# Patient Record
Sex: MALE | Race: WHITE | HISPANIC OR LATINO | URBAN - METROPOLITAN AREA
[De-identification: names, ages, dates, MRNs, and addresses within clinical notes are randomized per-mention and may not be internally consistent; named-entity substitution may affect disease eponyms.]

---

## 2017-07-27 ENCOUNTER — INPATIENT (INPATIENT)
Facility: HOSPITAL | Age: 47
LOS: 2 days | Discharge: HOME | End: 2017-07-30
Attending: FAMILY MEDICINE | Admitting: INTERNAL MEDICINE
Payer: COMMERCIAL

## 2017-07-27 VITALS — WEIGHT: 175.05 LBS | HEIGHT: 73 IN

## 2017-07-27 DIAGNOSIS — I21.3 ST ELEVATION (STEMI) MYOCARDIAL INFARCTION OF UNSPECIFIED SITE: ICD-10-CM

## 2017-07-27 DIAGNOSIS — E78.5 HYPERLIPIDEMIA, UNSPECIFIED: ICD-10-CM

## 2017-07-27 DIAGNOSIS — Z98.890 OTHER SPECIFIED POSTPROCEDURAL STATES: Chronic | ICD-10-CM

## 2017-07-27 DIAGNOSIS — N17.9 ACUTE KIDNEY FAILURE, UNSPECIFIED: ICD-10-CM

## 2017-07-27 LAB
ALBUMIN SERPL ELPH-MCNC: 4.4 G/DL — SIGNIFICANT CHANGE UP (ref 3.3–5)
ALP SERPL-CCNC: 103 U/L — SIGNIFICANT CHANGE UP (ref 40–120)
ALT FLD-CCNC: 78 U/L — SIGNIFICANT CHANGE UP (ref 12–78)
ANION GAP SERPL CALC-SCNC: 11 MMOL/L — SIGNIFICANT CHANGE UP (ref 5–17)
AST SERPL-CCNC: 47 U/L — HIGH (ref 15–37)
BASOPHILS # BLD AUTO: 0.2 K/UL — SIGNIFICANT CHANGE UP (ref 0–0.2)
BASOPHILS NFR BLD AUTO: 1.8 % — SIGNIFICANT CHANGE UP (ref 0–2)
BILIRUB SERPL-MCNC: 0.7 MG/DL — SIGNIFICANT CHANGE UP (ref 0.2–1.2)
BLD GP AB SCN SERPL QL: SIGNIFICANT CHANGE UP
BUN SERPL-MCNC: 14 MG/DL — SIGNIFICANT CHANGE UP (ref 7–23)
CALCIUM SERPL-MCNC: 9.5 MG/DL — SIGNIFICANT CHANGE UP (ref 8.5–10.1)
CHLORIDE SERPL-SCNC: 105 MMOL/L — SIGNIFICANT CHANGE UP (ref 96–108)
CO2 SERPL-SCNC: 25 MMOL/L — SIGNIFICANT CHANGE UP (ref 22–31)
CREAT SERPL-MCNC: 1.56 MG/DL — HIGH (ref 0.5–1.3)
EOSINOPHIL # BLD AUTO: 0.2 K/UL — SIGNIFICANT CHANGE UP (ref 0–0.5)
EOSINOPHIL NFR BLD AUTO: 2.4 % — SIGNIFICANT CHANGE UP (ref 0–6)
GLUCOSE SERPL-MCNC: 143 MG/DL — HIGH (ref 70–99)
HCT VFR BLD CALC: 46 % — SIGNIFICANT CHANGE UP (ref 39–50)
HGB BLD-MCNC: 16.3 G/DL — SIGNIFICANT CHANGE UP (ref 13–17)
LYMPHOCYTES # BLD AUTO: 3.7 K/UL — HIGH (ref 1–3.3)
LYMPHOCYTES # BLD AUTO: 35.2 % — SIGNIFICANT CHANGE UP (ref 13–44)
MCHC RBC-ENTMCNC: 31.1 PG — SIGNIFICANT CHANGE UP (ref 27–34)
MCHC RBC-ENTMCNC: 35.4 GM/DL — SIGNIFICANT CHANGE UP (ref 32–36)
MCV RBC AUTO: 87.8 FL — SIGNIFICANT CHANGE UP (ref 80–100)
MONOCYTES # BLD AUTO: 0.7 K/UL — SIGNIFICANT CHANGE UP (ref 0–0.9)
MONOCYTES NFR BLD AUTO: 6.9 % — SIGNIFICANT CHANGE UP (ref 2–14)
NEUTROPHILS # BLD AUTO: 5.6 K/UL — SIGNIFICANT CHANGE UP (ref 1.8–7.4)
NEUTROPHILS NFR BLD AUTO: 53.8 % — SIGNIFICANT CHANGE UP (ref 43–77)
PLATELET # BLD AUTO: 292 K/UL — SIGNIFICANT CHANGE UP (ref 150–400)
POTASSIUM SERPL-MCNC: 4 MMOL/L — SIGNIFICANT CHANGE UP (ref 3.5–5.3)
POTASSIUM SERPL-SCNC: 4 MMOL/L — SIGNIFICANT CHANGE UP (ref 3.5–5.3)
PROT SERPL-MCNC: 7.4 GM/DL — SIGNIFICANT CHANGE UP (ref 6–8.3)
RBC # BLD: 5.24 M/UL — SIGNIFICANT CHANGE UP (ref 4.2–5.8)
RBC # FLD: 11.9 % — SIGNIFICANT CHANGE UP (ref 10.3–14.5)
SODIUM SERPL-SCNC: 141 MMOL/L — SIGNIFICANT CHANGE UP (ref 135–145)
TROPONIN I SERPL-MCNC: <0.015 NG/ML — SIGNIFICANT CHANGE UP (ref 0.01–0.04)
TYPE + AB SCN PNL BLD: SIGNIFICANT CHANGE UP
WBC # BLD: 10.4 K/UL — SIGNIFICANT CHANGE UP (ref 3.8–10.5)
WBC # FLD AUTO: 10.4 K/UL — SIGNIFICANT CHANGE UP (ref 3.8–10.5)

## 2017-07-27 PROCEDURE — 99285 EMERGENCY DEPT VISIT HI MDM: CPT

## 2017-07-27 PROCEDURE — 93010 ELECTROCARDIOGRAM REPORT: CPT | Mod: 76

## 2017-07-27 RX ORDER — HEPARIN SODIUM 5000 [USP'U]/ML
5000 INJECTION INTRAVENOUS; SUBCUTANEOUS ONCE
Qty: 0 | Refills: 0 | Status: COMPLETED | OUTPATIENT
Start: 2017-07-27 | End: 2017-07-27

## 2017-07-27 RX ORDER — ATORVASTATIN CALCIUM 80 MG/1
80 TABLET, FILM COATED ORAL AT BEDTIME
Qty: 0 | Refills: 0 | Status: DISCONTINUED | OUTPATIENT
Start: 2017-07-27 | End: 2017-07-30

## 2017-07-27 RX ORDER — METOPROLOL TARTRATE 50 MG
5 TABLET ORAL ONCE
Qty: 0 | Refills: 0 | Status: COMPLETED | OUTPATIENT
Start: 2017-07-27 | End: 2017-07-27

## 2017-07-27 RX ORDER — MORPHINE SULFATE 50 MG/1
4 CAPSULE, EXTENDED RELEASE ORAL ONCE
Qty: 0 | Refills: 0 | Status: DISCONTINUED | OUTPATIENT
Start: 2017-07-27 | End: 2017-07-27

## 2017-07-27 RX ORDER — ASPIRIN/CALCIUM CARB/MAGNESIUM 324 MG
81 TABLET ORAL DAILY
Qty: 0 | Refills: 0 | Status: DISCONTINUED | OUTPATIENT
Start: 2017-07-28 | End: 2017-07-30

## 2017-07-27 RX ORDER — TICAGRELOR 90 MG/1
90 TABLET ORAL
Qty: 0 | Refills: 0 | Status: DISCONTINUED | OUTPATIENT
Start: 2017-07-27 | End: 2017-07-30

## 2017-07-27 RX ORDER — LISINOPRIL 2.5 MG/1
2.5 TABLET ORAL DAILY
Qty: 0 | Refills: 0 | Status: DISCONTINUED | OUTPATIENT
Start: 2017-07-28 | End: 2017-07-30

## 2017-07-27 RX ORDER — SODIUM CHLORIDE 9 MG/ML
3 INJECTION INTRAMUSCULAR; INTRAVENOUS; SUBCUTANEOUS ONCE
Qty: 0 | Refills: 0 | Status: COMPLETED | OUTPATIENT
Start: 2017-07-27 | End: 2017-07-27

## 2017-07-27 RX ORDER — ACETAMINOPHEN 500 MG
650 TABLET ORAL EVERY 6 HOURS
Qty: 0 | Refills: 0 | Status: DISCONTINUED | OUTPATIENT
Start: 2017-07-27 | End: 2017-07-28

## 2017-07-27 RX ORDER — METOPROLOL TARTRATE 50 MG
25 TABLET ORAL
Qty: 0 | Refills: 0 | Status: DISCONTINUED | OUTPATIENT
Start: 2017-07-27 | End: 2017-07-29

## 2017-07-27 RX ORDER — HEPARIN SODIUM 5000 [USP'U]/ML
4000 INJECTION INTRAVENOUS; SUBCUTANEOUS ONCE
Qty: 0 | Refills: 0 | Status: DISCONTINUED | OUTPATIENT
Start: 2017-07-27 | End: 2017-07-27

## 2017-07-27 RX ORDER — SODIUM CHLORIDE 9 MG/ML
1000 INJECTION INTRAMUSCULAR; INTRAVENOUS; SUBCUTANEOUS
Qty: 0 | Refills: 0 | Status: DISCONTINUED | OUTPATIENT
Start: 2017-07-27 | End: 2017-07-30

## 2017-07-27 RX ORDER — ASPIRIN/CALCIUM CARB/MAGNESIUM 324 MG
325 TABLET ORAL ONCE
Qty: 0 | Refills: 0 | Status: COMPLETED | OUTPATIENT
Start: 2017-07-27 | End: 2017-07-27

## 2017-07-27 RX ORDER — HEPARIN SODIUM 5000 [USP'U]/ML
4000 INJECTION INTRAVENOUS; SUBCUTANEOUS EVERY 6 HOURS
Qty: 0 | Refills: 0 | Status: DISCONTINUED | OUTPATIENT
Start: 2017-07-27 | End: 2017-07-27

## 2017-07-27 RX ADMIN — MORPHINE SULFATE 4 MILLIGRAM(S): 50 CAPSULE, EXTENDED RELEASE ORAL at 18:29

## 2017-07-27 RX ADMIN — SODIUM CHLORIDE 50 MILLILITER(S): 9 INJECTION INTRAMUSCULAR; INTRAVENOUS; SUBCUTANEOUS at 20:18

## 2017-07-27 RX ADMIN — SODIUM CHLORIDE 3 MILLILITER(S): 9 INJECTION INTRAMUSCULAR; INTRAVENOUS; SUBCUTANEOUS at 18:27

## 2017-07-27 RX ADMIN — HEPARIN SODIUM 5000 UNIT(S): 5000 INJECTION INTRAVENOUS; SUBCUTANEOUS at 18:30

## 2017-07-27 RX ADMIN — Medication 325 MILLIGRAM(S): at 18:32

## 2017-07-27 RX ADMIN — Medication 5 MILLIGRAM(S): at 18:34

## 2017-07-27 RX ADMIN — MORPHINE SULFATE 4 MILLIGRAM(S): 50 CAPSULE, EXTENDED RELEASE ORAL at 18:33

## 2017-07-27 NOTE — CONSULT NOTE ADULT - ASSESSMENT
Anterior wall STEMI    Suggest:    Cardiac monitor  O2 supplement  Follow up Cardiac enzymes  Treat with DAPT  IV Heparin bolus  Beta blockers  Statins  Ischemia evaluation - Based on pt's symptoms, history, risk factors, exam, lab and EKG data I have advised pt have a cardiac catheterization and possible percutaneous coronary intervention. Procedure, its risks, alternatives, benefits and potential complications were discussed in detail. Risks include but not limited to bleeding, infection, allergy, renal failure requiring dialysis, stroke, vascular injury, pericardial tamponade, arrhythmias, MI and even death. Pt is agreeable and has consented for the procedure and the procedure is being scheduled.     Admit to CCU. Further treatment orders after cardiac cath, meanwhile continue current treatment with DAPT. Keep pain free with Morphine as needed and tolerated. Keep optimal BP and HR.

## 2017-07-27 NOTE — CONSULT NOTE ADULT - SUBJECTIVE AND OBJECTIVE BOX
Chief complaint of chest pain    HPI:  45 yo male with CP while playing basketball. Diaphoretic. Clenching his chest. No syncope.      PAST MEDICAL & SURGICAL HISTORY:  None       PREVIOUS CARDIAC WORKUP:   None       ALLERGIES:    No Known Allergies       MEDICATIONS  (STANDING):    MEDICATIONS  (PRN):  heparin  Injectable 4000 Unit(s) IV Push every 6 hours PRN For aPTT less than 40      FAMILY HISTORY:  Negative for early onset CAD         SOCIAL HISTORY:  Nonsmoker. No ETOH abuse. No illicit drugs.     ROS:     A comprehensive review of systems was performed and pertinent items are noted in the history above. A detailed ROS is as follows:    Constitutional:	no weakness, no fever, no chills, no malaise, no weight change, no appetite changes  HEENT: 	no vision change, no hearing loss, no epistaxis, no sore throat, no hoarseness  Neck:		no neck pain, no neck swelling, no swollen glands  Respiratory:	+ dyspnea, no cough, no wheezing, no hemoptysis  Cardiac:	+ chest pain, no palpitations, no orthopnea, no dizziness, no syncope  GI:		no nausea, no vomiting, no change in BM, no GI bleed, no melena, no abdominal pain, no GERD, no dysphagia  :		no dysuria, no polyuria, no hematuria, no incontinence  Skin/Breast: 	no skin rash, no breast mass  Heme: 		no bleeding disorder, no clotting disorder, no easy bruising, no swollen lymph nodes  MS:		no arthralgia, no back pain, no myalgia  Vascular:	no claudication, no varicose veins, no ulcers  Neuro:		no sensory loss, no motor loss, no tremors, no seizures, no gait problems, no headache  Behavioral: 	no mood change, depression, anxiety, suicidal attempts  Endocrine:	no polydipsia, no polyphagia, no polyuria, no skin dryness, no temperature intolerance  Immunologic:	no anaphylaxis, no angioedema, no urticaria   Vital Signs Last 24 Hrs  T(C): 36.4 (27 Jul 2017 18:22), Max: 36.4 (27 Jul 2017 18:22)  T(F): 97.6 (27 Jul 2017 18:22), Max: 97.6 (27 Jul 2017 18:22)  HR: 103 (27 Jul 2017 18:22) (103 - 103)  BP: 148/81 (27 Jul 2017 18:22) (148/81 - 148/81)  BP(mean): --  RR: 25 (27 Jul 2017 18:22) (25 - 25)  SpO2: 100% (27 Jul 2017 18:22) (100% - 100%)    I&O's Summary      PHYSICAL EXAM:    General Appearance:    AAO X 3, uncomfortable.  HEENT:                       Atraumatic, PERRLA, EOMI, conjunctiva clear.   Neck:                          Supple, no adenopathy, no thyromegaly, no JVD, no carotid bruit  Back:                          Symmetric, no CV angle fullness or tenderness, no soft tissue tenderness.  Chest:                         Clear to auscultation, no wheezes, rales or rhonchi, symmetric air entry, no tachypnea, retractions or cyanosis  Heart:                          S1, S2 normal, no gallop, no murmur.  Abdomen:                    Soft, non-tender, bowel sounds active. No palpable masses.   Extremities:                 no cyanosis, no edema, no joint swelling. Peripheral pulses normal  Skin:                           Skin color, texture normal. No rashes   Neurologic:                  Grossly cranial nerves intact, sensory and motor normal.      TELEMETRY:  Sinus tachycardia    ECG:  NSR, Anterior STEMI    LABS:  Pending                             RADIOLOGY & ADDITIONAL STUDIES:

## 2017-07-27 NOTE — ED PROVIDER NOTE - PROGRESS NOTE DETAILS
I, Lore Bautista am scribing for and in the presence of Dr. Herrera for this pt. PCI called after EKG was obtained. Aspirin, Heparin and Morphine given at doctors recommendation.

## 2017-07-27 NOTE — H&P ADULT - HISTORY OF PRESENT ILLNESS
45 y/o male with PMHx of HLD that presents to the ED after acute onset of central chest pain while playing basketball today.   Patient states that he usually gets "fatigued' while playing at times.  He states that today while playing he developed 10/10 central chest pain, radiating to the teeth and left shoulder.  He states that the pain lasted for about 25 minutes and worsened when walking.  He denies nausea or vomiting.  Patient seen in ED with STEMI in anterior leads.  He was taken to cath lab and had thrombectomy and stent to LAD.

## 2017-07-27 NOTE — ED ADULT NURSE REASSESSMENT NOTE - NS ED NURSE REASSESS COMMENT FT1
Report given to RN in cath lab Dr. Rothman at bedside, Report given to RN in cath lab. Pacer pads and cardiac monitor on patient. Pt to be sent to cath lab.

## 2017-07-27 NOTE — ED PROVIDER NOTE - MEDICAL DECISION MAKING DETAILS
Pt with STEMI on presenting EKG - PCI team called immediately and pt was taken for urgent cath shortly after ED presentation. Given full dose asa, heparin bolus, morphine in ED per recs of PCI

## 2017-07-27 NOTE — ED ADULT NURSE NOTE - OBJECTIVE STATEMENT
Pt alert and oriented x3. Pt presents with chest pain s/p playing basketball. Pt  pale diaphoretic. ST elevations noted to EKG. PCI called. Pt alert and oriented x3. Pt presents with chest pain s/p playing basketball. Pt clutching chest EKG obtained, ST elevations noted. Dr. Herrera at bedside Pt  pale diaphoretic. Pacer pads placed on patient. Pt undressed, 2 IV inserted.

## 2017-07-27 NOTE — ED PROVIDER NOTE - OBJECTIVE STATEMENT
47 y/o male no known medical hx presents to ED due to constant cp that began at approximately 6:30pm today. Pt was playing basketball. Pain radiates to the neck and left shoulder, is associated with sob. Denies light headedness or syncope. No known family hx of CAD. Non smoker. Smokes marijuana occasionally. No cocaine. Presents w/ STEMI on EKG

## 2017-07-27 NOTE — H&P ADULT - NSHPPHYSICALEXAM_GEN_ALL_CORE
T(C): 36.4 (07-27-17 @ 18:22), Max: 36.4 (07-27-17 @ 18:22)  HR: 80 (07-27-17 @ 18:39) (80 - 103)  BP: 165/115 (07-27-17 @ 18:39) (148/81 - 167/110)  RR: 25 (07-27-17 @ 18:34) (25 - 25)  SpO2: 100% (07-27-17 @ 18:34) (100% - 100%)

## 2017-07-27 NOTE — ED ADULT TRIAGE NOTE - CHIEF COMPLAINT QUOTE
I am having chest pain. Pt was playing basketball about 1/2 ago started having cp and sob.   denies drinking any energy drinks. pt clenching his chest in pain

## 2017-07-27 NOTE — CHART NOTE - NSCHARTNOTEFT_GEN_A_CORE
Cardiology NP note    S/P PCI of LAD    HPI: 46 year old male with PMHx HLD not on meds presented in ER with severe  CP which started while playing basketball. EKG revealed ZOLTAN in anterior leads. Pt was emergently taken to the cath lab    A+Ox3  Denies CP, SOB, dizziness, palpitation  Right femoral cath site with Perclose closure. No bleeding, no hematoma. Pedal pulses palpable    Plan:  -Admit to CCU  -D/W Dr Rothman  -Monitor site for hematoma/bleeding  -ASA, Brilinta, BB, ACEI  -Gentle IV hydration  -Monitor creat  -Post PCI instructions given

## 2017-07-27 NOTE — CONSULT NOTE ADULT - ATTENDING COMMENTS
Case discussed with the team and all records reviewed. Greater than 50% of the visit was for coordination of care.; Total face to face time:  50   minutes. Time is exclusive of billed procedures for the same day of service

## 2017-07-28 LAB
ANION GAP SERPL CALC-SCNC: 5 MMOL/L — SIGNIFICANT CHANGE UP (ref 5–17)
BUN SERPL-MCNC: 13 MG/DL — SIGNIFICANT CHANGE UP (ref 7–23)
CALCIUM SERPL-MCNC: 8.6 MG/DL — SIGNIFICANT CHANGE UP (ref 8.5–10.1)
CHLORIDE SERPL-SCNC: 104 MMOL/L — SIGNIFICANT CHANGE UP (ref 96–108)
CHOLEST SERPL-MCNC: 247 MG/DL — HIGH (ref 10–199)
CO2 SERPL-SCNC: 28 MMOL/L — SIGNIFICANT CHANGE UP (ref 22–31)
CREAT SERPL-MCNC: 1.11 MG/DL — SIGNIFICANT CHANGE UP (ref 0.5–1.3)
GLUCOSE SERPL-MCNC: 103 MG/DL — HIGH (ref 70–99)
HBA1C BLD-MCNC: 5.2 % — SIGNIFICANT CHANGE UP (ref 4–5.6)
HCT VFR BLD CALC: 42.8 % — SIGNIFICANT CHANGE UP (ref 39–50)
HDLC SERPL-MCNC: 32 MG/DL — LOW (ref 40–125)
HGB BLD-MCNC: 15.1 G/DL — SIGNIFICANT CHANGE UP (ref 13–17)
LIPID PNL WITH DIRECT LDL SERPL: 164 MG/DL — HIGH
MCHC RBC-ENTMCNC: 31.1 PG — SIGNIFICANT CHANGE UP (ref 27–34)
MCHC RBC-ENTMCNC: 35.4 GM/DL — SIGNIFICANT CHANGE UP (ref 32–36)
MCV RBC AUTO: 88 FL — SIGNIFICANT CHANGE UP (ref 80–100)
PLATELET # BLD AUTO: 239 K/UL — SIGNIFICANT CHANGE UP (ref 150–400)
POTASSIUM SERPL-MCNC: 3.7 MMOL/L — SIGNIFICANT CHANGE UP (ref 3.5–5.3)
POTASSIUM SERPL-SCNC: 3.7 MMOL/L — SIGNIFICANT CHANGE UP (ref 3.5–5.3)
RBC # BLD: 4.86 M/UL — SIGNIFICANT CHANGE UP (ref 4.2–5.8)
RBC # FLD: 11.8 % — SIGNIFICANT CHANGE UP (ref 10.3–14.5)
SODIUM SERPL-SCNC: 137 MMOL/L — SIGNIFICANT CHANGE UP (ref 135–145)
TOTAL CHOLESTEROL/HDL RATIO MEASUREMENT: 7.7 RATIO — SIGNIFICANT CHANGE UP (ref 3.4–9.6)
TRIGL SERPL-MCNC: 255 MG/DL — HIGH (ref 10–149)
TROPONIN I SERPL-MCNC: 26.9 NG/ML — HIGH (ref 0.01–0.04)
WBC # BLD: 11.3 K/UL — HIGH (ref 3.8–10.5)
WBC # FLD AUTO: 11.3 K/UL — HIGH (ref 3.8–10.5)

## 2017-07-28 PROCEDURE — 93306 TTE W/DOPPLER COMPLETE: CPT | Mod: 26

## 2017-07-28 PROCEDURE — 93010 ELECTROCARDIOGRAM REPORT: CPT

## 2017-07-28 RX ORDER — PANTOPRAZOLE SODIUM 20 MG/1
40 TABLET, DELAYED RELEASE ORAL
Qty: 0 | Refills: 0 | Status: DISCONTINUED | OUTPATIENT
Start: 2017-07-28 | End: 2017-07-30

## 2017-07-28 RX ORDER — ONDANSETRON 8 MG/1
4 TABLET, FILM COATED ORAL ONCE
Qty: 0 | Refills: 0 | Status: COMPLETED | OUTPATIENT
Start: 2017-07-28 | End: 2017-07-28

## 2017-07-28 RX ORDER — ACETAMINOPHEN 500 MG
650 TABLET ORAL EVERY 6 HOURS
Qty: 0 | Refills: 0 | Status: DISCONTINUED | OUTPATIENT
Start: 2017-07-28 | End: 2017-07-30

## 2017-07-28 RX ADMIN — TICAGRELOR 90 MILLIGRAM(S): 90 TABLET ORAL at 05:59

## 2017-07-28 RX ADMIN — LISINOPRIL 2.5 MILLIGRAM(S): 2.5 TABLET ORAL at 05:59

## 2017-07-28 RX ADMIN — Medication 25 MILLIGRAM(S): at 21:42

## 2017-07-28 RX ADMIN — ATORVASTATIN CALCIUM 80 MILLIGRAM(S): 80 TABLET, FILM COATED ORAL at 05:59

## 2017-07-28 RX ADMIN — Medication 81 MILLIGRAM(S): at 11:26

## 2017-07-28 RX ADMIN — TICAGRELOR 90 MILLIGRAM(S): 90 TABLET ORAL at 18:06

## 2017-07-28 RX ADMIN — PANTOPRAZOLE SODIUM 40 MILLIGRAM(S): 20 TABLET, DELAYED RELEASE ORAL at 08:19

## 2017-07-28 RX ADMIN — ATORVASTATIN CALCIUM 80 MILLIGRAM(S): 80 TABLET, FILM COATED ORAL at 21:42

## 2017-07-28 RX ADMIN — Medication 30 MILLILITER(S): at 04:41

## 2017-07-28 RX ADMIN — ONDANSETRON 4 MILLIGRAM(S): 8 TABLET, FILM COATED ORAL at 07:51

## 2017-07-28 RX ADMIN — Medication 25 MILLIGRAM(S): at 10:20

## 2017-07-28 NOTE — PROVIDER CONTACT NOTE (CHANGE IN STATUS NOTIFICATION) - SITUATION
pt complained of chest discomfort, states it feels like gas, maalox given with little relief.  EKG don and cardiology NP called and in to see patient.  will monitor

## 2017-07-29 PROCEDURE — 93010 ELECTROCARDIOGRAM REPORT: CPT

## 2017-07-29 RX ORDER — METOPROLOL TARTRATE 50 MG
25 TABLET ORAL DAILY
Qty: 0 | Refills: 0 | Status: DISCONTINUED | OUTPATIENT
Start: 2017-07-29 | End: 2017-07-30

## 2017-07-29 RX ADMIN — LISINOPRIL 2.5 MILLIGRAM(S): 2.5 TABLET ORAL at 06:41

## 2017-07-29 RX ADMIN — Medication 25 MILLIGRAM(S): at 18:43

## 2017-07-29 RX ADMIN — Medication 81 MILLIGRAM(S): at 12:28

## 2017-07-29 RX ADMIN — TICAGRELOR 90 MILLIGRAM(S): 90 TABLET ORAL at 12:28

## 2017-07-29 RX ADMIN — TICAGRELOR 90 MILLIGRAM(S): 90 TABLET ORAL at 22:14

## 2017-07-29 RX ADMIN — ATORVASTATIN CALCIUM 80 MILLIGRAM(S): 80 TABLET, FILM COATED ORAL at 22:14

## 2017-07-29 RX ADMIN — PANTOPRAZOLE SODIUM 40 MILLIGRAM(S): 20 TABLET, DELAYED RELEASE ORAL at 09:02

## 2017-07-29 NOTE — PROGRESS NOTE ADULT - PROBLEM SELECTOR PLAN 1
s/p PCI to LAD  aspirin, brillinta, lopressor, statin, lasix, and lisinopril  TTE in am  EKG in am    7/29/17 switch to toprol xl

## 2017-07-30 VITALS — WEIGHT: 187.39 LBS

## 2017-07-30 LAB
ANION GAP SERPL CALC-SCNC: 5 MMOL/L — SIGNIFICANT CHANGE UP (ref 5–17)
BUN SERPL-MCNC: 18 MG/DL — SIGNIFICANT CHANGE UP (ref 7–23)
CALCIUM SERPL-MCNC: 8.8 MG/DL — SIGNIFICANT CHANGE UP (ref 8.5–10.1)
CHLORIDE SERPL-SCNC: 105 MMOL/L — SIGNIFICANT CHANGE UP (ref 96–108)
CO2 SERPL-SCNC: 29 MMOL/L — SIGNIFICANT CHANGE UP (ref 22–31)
CREAT SERPL-MCNC: 1.26 MG/DL — SIGNIFICANT CHANGE UP (ref 0.5–1.3)
GLUCOSE SERPL-MCNC: 103 MG/DL — HIGH (ref 70–99)
HCT VFR BLD CALC: 43 % — SIGNIFICANT CHANGE UP (ref 39–50)
HGB BLD-MCNC: 15 G/DL — SIGNIFICANT CHANGE UP (ref 13–17)
MCHC RBC-ENTMCNC: 31.1 PG — SIGNIFICANT CHANGE UP (ref 27–34)
MCHC RBC-ENTMCNC: 34.9 GM/DL — SIGNIFICANT CHANGE UP (ref 32–36)
MCV RBC AUTO: 89.1 FL — SIGNIFICANT CHANGE UP (ref 80–100)
PLATELET # BLD AUTO: 234 K/UL — SIGNIFICANT CHANGE UP (ref 150–400)
POTASSIUM SERPL-MCNC: 4.1 MMOL/L — SIGNIFICANT CHANGE UP (ref 3.5–5.3)
POTASSIUM SERPL-SCNC: 4.1 MMOL/L — SIGNIFICANT CHANGE UP (ref 3.5–5.3)
RBC # BLD: 4.82 M/UL — SIGNIFICANT CHANGE UP (ref 4.2–5.8)
RBC # FLD: 11.8 % — SIGNIFICANT CHANGE UP (ref 10.3–14.5)
SODIUM SERPL-SCNC: 139 MMOL/L — SIGNIFICANT CHANGE UP (ref 135–145)
WBC # BLD: 13.3 K/UL — HIGH (ref 3.8–10.5)
WBC # FLD AUTO: 13.3 K/UL — HIGH (ref 3.8–10.5)

## 2017-07-30 RX ORDER — METOPROLOL TARTRATE 50 MG
1 TABLET ORAL
Qty: 30 | Refills: 0 | OUTPATIENT
Start: 2017-07-30 | End: 2017-08-29

## 2017-07-30 RX ORDER — LISINOPRIL 2.5 MG/1
1 TABLET ORAL
Qty: 30 | Refills: 0 | OUTPATIENT
Start: 2017-07-30 | End: 2017-08-29

## 2017-07-30 RX ORDER — TICAGRELOR 90 MG/1
1 TABLET ORAL
Qty: 60 | Refills: 0 | OUTPATIENT
Start: 2017-07-30 | End: 2017-08-29

## 2017-07-30 RX ORDER — ATORVASTATIN CALCIUM 80 MG/1
1 TABLET, FILM COATED ORAL
Qty: 30 | Refills: 0 | OUTPATIENT
Start: 2017-07-30 | End: 2017-08-29

## 2017-07-30 RX ORDER — ASPIRIN/CALCIUM CARB/MAGNESIUM 324 MG
1 TABLET ORAL
Qty: 0 | Refills: 0 | COMMUNITY
Start: 2017-07-30

## 2017-07-30 RX ADMIN — PANTOPRAZOLE SODIUM 40 MILLIGRAM(S): 20 TABLET, DELAYED RELEASE ORAL at 10:35

## 2017-07-30 RX ADMIN — TICAGRELOR 90 MILLIGRAM(S): 90 TABLET ORAL at 10:36

## 2017-07-30 RX ADMIN — Medication 81 MILLIGRAM(S): at 10:36

## 2017-07-30 RX ADMIN — LISINOPRIL 2.5 MILLIGRAM(S): 2.5 TABLET ORAL at 06:15

## 2017-07-30 RX ADMIN — Medication 25 MILLIGRAM(S): at 10:35

## 2017-07-30 NOTE — DISCHARGE NOTE ADULT - PLAN OF CARE
prevent recurrence take meds as advised and follow up with Dr. Rothman this coming week.   Have repeat echo done to follow up heart function.

## 2017-07-30 NOTE — PROGRESS NOTE ADULT - ASSESSMENT
47 y/o male with PMHx of HLD that presents to the ED after acute onset of central chest pain while playing basketball today.  On presentation to ED found to STEMI    Admit to CCU  Vitals per routine
Anterior STEMI  CAD - MELODIE of LAD  Hyperlipidemia    Suggest:    DAPT ASA 81QD + Brilinta 90 BID  beta blockers  Statins  ACEi    Ambulate  Discharge planning
Anterior STEMI  CAD - MELODIE of LAD He is ambulating & stable.  Hyperlipidemia    Suggest:    DAPT ASA 81QD + Brilinta 90 BID  beta blockers  Statins  ACEi  He was educated on meds / diet & activity.  He has an appointment with Dr Rothman next week.  Ambulate

## 2017-07-30 NOTE — DISCHARGE NOTE ADULT - PATIENT PORTAL LINK FT
“You can access the [employer] Patient Portal, offered by [facility], by registering with the following website: [email or web address]”

## 2017-07-30 NOTE — DISCHARGE NOTE ADULT - MEDICATION SUMMARY - MEDICATIONS TO TAKE
I will START or STAY ON the medications listed below when I get home from the hospital:    aspirin 81 mg oral tablet, chewable  -- 1 tab(s) by mouth once a day  -- Indication: For cad/stent    lisinopril 2.5 mg oral tablet  -- 1 tab(s) by mouth once a day  -- Indication: For cad/CHF    atorvastatin 80 mg oral tablet  -- 1 tab(s) by mouth once a day (at bedtime)  -- Indication: For CAD/Stent    ticagrelor 90 mg oral tablet  -- 1 tab(s) by mouth 2 times a day  -- Indication: For CAD/stent    metoprolol succinate 25 mg oral tablet, extended release  -- 1 tab(s) by mouth once a day  -- Indication: For CAD/stent

## 2017-07-30 NOTE — DISCHARGE NOTE ADULT - CARE PROVIDER_API CALL
João Rothman), Cardiology; Interventional Cardiology  [address] [place] [address]  Phone: [phone]  Fax: [phone]

## 2017-07-30 NOTE — PROGRESS NOTE ADULT - SUBJECTIVE AND OBJECTIVE BOX
Doing well. Intermittent nausea before breakfast today. Better with diet and  zofran. No chest pain currently. Feels OK. OOB to chair. No arrhythmias. Right groin tender but no hematoma or bleed.     CURRENT CARDIAC WORKUP:       Cardiac Cath:  MELODIE of the proximal LAD. medical management of the D1    Allergies:   No Known Drug Allergies  Nuts (Unknown)      MEDICATIONS  (STANDING):  aspirin  chewable 81 milliGRAM(s) Chew daily  metoprolol 25 milliGRAM(s) Oral two times a day  lisinopril 2.5 milliGRAM(s) Oral daily  atorvastatin 80 milliGRAM(s) Oral at bedtime  ticagrelor 90 milliGRAM(s) Oral two times a day  sodium chloride 0.9%. 1000 milliLiter(s) (50 mL/Hr) IV Continuous <Continuous>  pantoprazole    Tablet 40 milliGRAM(s) Oral before breakfast    MEDICATIONS  (PRN):  aluminum hydroxide/magnesium hydroxide/simethicone Suspension 30 milliLiter(s) Oral every 6 hours PRN Dyspepsia  acetaminophen   Tablet. 650 milliGRAM(s) Oral every 6 hours PRN Moderate Pain (4 - 6)      ROS:     Detailed ten system ROS was performed and was negative except for history as eluded to above.    no fever  no chills  + nausea  no vomiting  no diarrhea  no constipation  no melena  no hematochezia  + chest pain  no palpitations  no sob  no dyspnea  no cough  no wheezing  no anorexia  no headache  no dizziness  no syncope  no weakness  no myalgia  no dysuria  no polyuria  no hematuria       Vital Signs Last 24 Hrs  T(C): 36.4 (28 Jul 2017 09:53), Max: 36.4 (27 Jul 2017 18:22)  T(F): 97.6 (28 Jul 2017 09:53), Max: 97.6 (27 Jul 2017 18:22)  HR: 64 (28 Jul 2017 10:19) (51 - 103)  BP: 119/65 (28 Jul 2017 10:19) (92/64 - 167/110)  BP(mean): 78 (28 Jul 2017 10:19) (68 - 98)  RR: 19 (28 Jul 2017 05:00) (12 - 25)  SpO2: 100% (28 Jul 2017 10:19) (94% - 100%)    I&O's Summary      PHYSICAL EXAM:    General Appearance:    Comfortable, AAO X 3, in no distress.   HEENT:                       Atraumatic, PERRLA, EOMI, conjunctiva clear.   Neck:                          Supple, no adenopathy, no thyromegaly, no JVD, no carotid bruit  Back:                          Symmetric, no CV angle fullness or tenderness, no soft tissue tenderness.  Chest:                         Clear to auscultation, no wheezes, rales or rhonchi, symmetric air entry, no tachypnea, retractions or cyanosis  Heart:                          S1, S2 normal, no gallop, no murmur.  Abdomen:                    Soft, non-tender, bowel sounds active. No palpable masses.   Extremities:                 no cyanosis, no edema, no joint swelling. Peripheral pulses normal  Skin:                           Skin color, texture normal. No rashes   Neurologic:                  Grossly cranial nerves intact, sensory and motor normal.      INTERPRETATION OF TELEMETRY:  NSR, intermittent AIVR. PVCs    ECG:  NSR, anterior MI evolutionary changes.    LABS:                        15.1   11.3  )-----------( 239      ( 28 Jul 2017 06:04 )             42.8     07-28    137  |  104  |  13  ----------------------------<  103<H>  3.7   |  28  |  1.11    Ca    8.6      28 Jul 2017 06:04    TPro  7.4  /  Alb  4.4  /  TBili  0.7  /  DBili  x   /  AST  47<H>  /  ALT  78  /  AlkPhos  103  07-27      Lipid Panel  Chl 247  HDL 32    Trg 255        07-28 @ 06:04  Trop-I 26.900  CK     --  CK MB  --    07-27 @ 18:33  Trop-I <0.015  CK     --  CK MB  --
Nurse Practitioner Progress note:   s/p [facility] after STEMI with successful PCI and MELODIE to LAD  observed overnight on tele    Patient feels well.  reports mild chest pain 3/10 that is intermittent with nausea, denies shortness of breath, dizziness or palpitations at this time.        EKG:NSR@ 64  TELE:no ectopy  GENERAL: appears well, OOB to chair  CV: RRR, S1 S2, no murmur, rub, clicks or gallop noted  RESP: LCTA bilaterally, no wheeze, no rhonchi or crackles noted  GI/: soft, NT/ND  NEURO: AOx4, no focal deficits  EXTREMITIES: no edema, clubbing or cyanosis noted  PROCEDURE SITE:  Right groin dressing removed, site CDI with no bleeding, no hematoma, area soft, non tender, positive pedal pulses bilaterally        T(C): 36.4 (07-28-17 @ 04:44), Max: 36.4 (07-27-17 @ 18:22)  HR: 51 (07-28-17 @ 06:00) (51 - 103)  BP: 104/70 (07-28-17 @ 06:00) (92/64 - 167/110)  RR: 19 (07-28-17 @ 05:00) (12 - 25)  SpO2: 98% (07-28-17 @ 06:00) (94% - 100%)  Wt(kg): --  CBC Full  -  ( 28 Jul 2017 06:04 )  WBC Count : 11.3 K/uL  Hemoglobin : 15.1 g/dL  Hematocrit : 42.8 %  Platelet Count - Automated : 239 K/uL  Mean Cell Volume : 88.0 fl  Mean Cell Hemoglobin : 31.1 pg  Mean Cell Hemoglobin Concentration : 35.4 gm/dL      07-28    137  |  104  |  13  ----------------------------<  103<H>  3.7   |  28  |  1.11    Ca    8.6      28 Jul 2017 06:04    TPro  7.4  /  Alb  4.4  /  TBili  0.7  /  DBili  x   /  AST  47<H>  /  ALT  78  /  AlkPhos  103  07-27    CARDIAC MARKERS ( 28 Jul 2017 06:04 )  26.900 ng/mL / x     / x     / x     / x      CARDIAC MARKERS ( 27 Jul 2017 18:33 )  <0.015 ng/mL / x     / x     / x     / x              MEDICATIONS  (STANDING):  aspirin  chewable 81 milliGRAM(s) Chew daily  metoprolol 25 milliGRAM(s) Oral two times a day  lisinopril 2.5 milliGRAM(s) Oral daily  atorvastatin 80 milliGRAM(s) Oral at bedtime  ticagrelor 90 milliGRAM(s) Oral two times a day  sodium chloride 0.9%. 1000 milliLiter(s) (50 mL/Hr) IV Continuous <Continuous>  ondansetron Injectable 4 milliGRAM(s) IV Push once    MEDICATIONS  (PRN):  aluminum hydroxide/magnesium hydroxide/simethicone Suspension 30 milliLiter(s) Oral every 6 hours PRN Dyspepsia  acetaminophen   Tablet. 650 milliGRAM(s) Oral every 6 hours PRN Moderate Pain (4 - 6)        HPI:  45 y/o male with PMHx of HLD that presents to the ED after acute onset of central chest pain while playing basketball   Patient seen in ED with STEMI in anterior leads.  He was taken to cath lab and had thrombectomy and stent to LAD  taken emergently to cath lab  now s/p [facility] with successful PCI and MELODIE to LAD    ASSESSMENT/PLAN:    Coronary artery disease    -OOB to chair, then ambulate ad ashley on tele  -continue aspirin, brilinta BB, ACE, statin  -start PPI  -Encourage PO fluids  -Plan of care discussed with patient, family and MD Rothman  -continue hospitalist service  -repeat ekg PRN for chest pain  -Discussed therapeutic lifestyle changes to reduce risk factors such as following a cardiac diet, weight loss, maintaining a healthy weight, exercise, smoking cessation, medication compliance, and regular follow-up  with MD to know your numbers (BP, cholesterol, weight, and glucose)
PCP: Dr. Lagunas    c/c: chest pain.     HPI:  45 y/o male with PMHx of HLD off statin presented to the ER after acute onset of central chest pain while playing basketball.   Patient states that he had been very fatigued lately. He attributed it to stress and heat. He exercises regularly, however while playing basketball, he  developed 10/10 central chest pain, radiating to the teeth and left shoulder.  He states that the pain lasted for about 25 minutes and worsened when walking.  In ED, EKG revealed STEMI in anterior leads.  He was taken to cath lab and had thrombectomy and stent to LAD. He is not admitted to CCU.    7/28: pt seen and examined this am. States he used to take lipitor but stopped it due to concern of side effects. He was watching his diet and exercising regularly, however he has not been compliant with his diet recently. He also has not seen his pcp in a while. He also admits to snoring at night with periods of waking up gasping for air at night, daytime somnolence. Had some chest discomfort earlier today which resolved.       Review of system- All 10 systems reviewed and is as per HPI otherwise negative.           T(C): 36.4 (07-28-17 @ 09:53), Max: 36.4 (07-27-17 @ 18:22)  HR: 61 (07-28-17 @ 11:00) (51 - 103)  BP: 121/74 (07-28-17 @ 11:00) (92/64 - 167/110)  RR: 19 (07-28-17 @ 05:00) (12 - 25)  SpO2: 100% (07-28-17 @ 11:00) (94% - 100%)  Wt(kg): --    PHYSICAL EXAM:    GENERAL: Comfortable, no acute distress  HEAD:  Atraumatic, Normocephalic  EYES: EOMI, PERRLA  HEENT: Moist mucous membranes  NECK: Supple, No JVD  NERVOUS SYSTEM:  Alert & Oriented X3, Motor Strength 5/5 B/L upper and lower extremities  CHEST/LUNG: Clear to auscultation bilaterally  HEART: Regular rate and rhythm; No murmurs, rubs, or gallops  ABDOMEN: Soft, Nontender, Nondistended; Bowel sounds present  GENITOURINARY- Voiding, no palpable bladder  EXTREMITIES:  No clubbing, cyanosis, or edema  MUSCULOSKELTAL- No muscle tenderness, No joint tenderness  SKIN-no rash        LABS:                        15.1   11.3  )-----------( 239      ( 28 Jul 2017 06:04 )             42.8     07-28    137  |  104  |  13  ----------------------------<  103<H>  3.7   |  28  |  1.11    Ca    8.6      28 Jul 2017 06:04    TPro  7.4  /  Alb  4.4  /  TBili  0.7  /  DBili  x   /  AST  47<H>  /  ALT  78  /  AlkPhos  103  07-27      CARDIAC MARKERS ( 28 Jul 2017 06:04 )  26.900 ng/mL / x     / x     / x     / x      CARDIAC MARKERS ( 27 Jul 2017 18:33 )  <0.015 ng/mL / x     / x     / x     / x            MEDS  aspirin  chewable 81 milliGRAM(s) Chew daily  metoprolol 25 milliGRAM(s) Oral two times a day  lisinopril 2.5 milliGRAM(s) Oral daily  atorvastatin 80 milliGRAM(s) Oral at bedtime  ticagrelor 90 milliGRAM(s) Oral two times a day  sodium chloride 0.9%. 1000 milliLiter(s) IV Continuous <Continuous>  aluminum hydroxide/magnesium hydroxide/simethicone Suspension 30 milliLiter(s) Oral every 6 hours PRN  acetaminophen   Tablet. 650 milliGRAM(s) Oral every 6 hours PRN  pantoprazole    Tablet 40 milliGRAM(s) Oral before breakfast    Assessment and Plan:    46M, pmh as above a/w:    1. Anterior Wall STEMI:  -s/p thrombectomy/stent to LAD  -continue asa/brillinta/bb/statin  -f/u echo    2. HLD:   -continue statin    3. Possible underlying sleep apnea:  -advised outpt sleep studies.    4. HERI:  -resolved    5. DVT px
PCP: Dr. Lagunas    c/c: chest pain.     HPI:  45 y/o male with PMHx of HLD off statin presented to the ER after acute onset of central chest pain while playing basketball.   Patient states that he had been very fatigued lately. He attributed it to stress and heat. He exercises regularly, however while playing basketball, he  developed 10/10 central chest pain, radiating to the teeth and left shoulder.  He states that the pain lasted for about 25 minutes and worsened when walking.  In ED, EKG revealed STEMI in anterior leads.  He was taken to cath lab and had thrombectomy and stent to LAD. He is not admitted to CCU.    7/29:pt seen and examined this am. Was feeling well. Had some chest discomfort overnight similar to yesterday am which resolved. No sob/cp at the time of exam. States he did feel tired and weak when his bp was low. HR also dips into 40s.       Review of system- All 10 systems reviewed and is as per HPI otherwise negative.     Vital Signs Last 24 Hrs  T(C): 36.6 (29 Jul 2017 08:29), Max: 36.8 (28 Jul 2017 20:00)  T(F): 97.8 (29 Jul 2017 08:29), Max: 98.2 (28 Jul 2017 20:00)  HR: 53 (29 Jul 2017 14:00) (50 - 63)  BP: 117/73 (29 Jul 2017 13:00) (83/53 - 117/73)  BP(mean): 82 (29 Jul 2017 13:00) (47 - 82)  RR: --  SpO2: 99% (29 Jul 2017 13:00) (95% - 100%)PHYSICAL EXAM:    GENERAL: Comfortable, no acute distress  HEAD:  Atraumatic, Normocephalic  EYES: EOMI, PERRLA  HEENT: Moist mucous membranes  NECK: Supple, No JVD  NERVOUS SYSTEM:  Alert & Oriented X3, Motor Strength 5/5 B/L upper and lower extremities  CHEST/LUNG: Clear to auscultation bilaterally  HEART: Regular rate and rhythm; No murmurs, rubs, or gallops  ABDOMEN: Soft, Nontender, Nondistended; Bowel sounds present  GENITOURINARY- Voiding, no palpable bladder  EXTREMITIES:  No clubbing, cyanosis, or edema  MUSCULOSKELTAL- No muscle tenderness, No joint tenderness  SKIN-no rash        LABS:                        15.1   11.3  )-----------( 239      ( 28 Jul 2017 06:04 )             42.8     07-28    137  |  104  |  13  ----------------------------<  103<H>  3.7   |  28  |  1.11    Ca    8.6      28 Jul 2017 06:04    TPro  7.4  /  Alb  4.4  /  TBili  0.7  /  DBili  x   /  AST  47<H>  /  ALT  78  /  AlkPhos  103  07-27      CARDIAC MARKERS ( 28 Jul 2017 06:04 )  26.900 ng/mL / x     / x     / x     / x      CARDIAC MARKERS ( 27 Jul 2017 18:33 )  <0.015 ng/mL / x     / x     / x     / x        Transthoracic Echocardiogram (07.28.17 @ 10:24) >  Summary     Left ventricle systolic function appears moderately impaired; Anterior   and   anteroseptal wall hypokinesis. Estimated Ejection Fraction is 35-40%.     Signature        MEDS  aspirin  chewable 81 milliGRAM(s) Chew daily  metoprolol 25 milliGRAM(s) Oral two times a day  lisinopril 2.5 milliGRAM(s) Oral daily  atorvastatin 80 milliGRAM(s) Oral at bedtime  ticagrelor 90 milliGRAM(s) Oral two times a day  sodium chloride 0.9%. 1000 milliLiter(s) IV Continuous <Continuous>  aluminum hydroxide/magnesium hydroxide/simethicone Suspension 30 milliLiter(s) Oral every 6 hours PRN  acetaminophen   Tablet. 650 milliGRAM(s) Oral every 6 hours PRN  pantoprazole    Tablet 40 milliGRAM(s) Oral before breakfast    Assessment and Plan:    46M, pmh as above a/w:    1. STEMI:  -s/p thrombectomy/stent to LAD  -continue asa/brillinta/bb/statin  -echo with anterior/anteroseptal wall hypokinesis, LVEF 35-40%  -continue asa/brillinta/statin.   -d/w cardio/NP-decreased bb, continue ace-i    2. HLD:   -continue statin    3. Possible underlying sleep apnea:  -advised outpt sleep studies.    4. HERI:  -resolved    5. DVT px
Pt is ambulating without any discomfort, no chest pain , SOB or dizziness. His wife at bedside he is anxious to go home.         Echo < from: Transthoracic Echocardiogram  07.28.17   Impression     Summary     Left ventricle systolic function appears moderately impaired; Anterior   and   anteroseptal wall hypokinesis. Estimated Ejection Fraction is 35-40%.      MEDICATIONS  (STANDING):  aspirin  chewable 81 milliGRAM(s) Chew daily  lisinopril 2.5 milliGRAM(s) Oral daily  atorvastatin 80 milliGRAM(s) Oral at bedtime  ticagrelor 90 milliGRAM(s) Oral two times a day  sodium chloride 0.9%. 1000 milliLiter(s) (50 mL/Hr) IV Continuous <Continuous>  pantoprazole    Tablet 40 milliGRAM(s) Oral before breakfast  metoprolol succinate ER 25 milliGRAM(s) Oral daily    MEDICATIONS  (PRN):  aluminum hydroxide/magnesium hydroxide/simethicone Suspension 30 milliLiter(s) Oral every 6 hours PRN Dyspepsia  acetaminophen   Tablet. 650 milliGRAM(s) Oral every 6 hours PRN Moderate Pain (4 - 6)          REVIEW OF SYSTEM:  Pertinent items are noted in HPI.  Constitutional	Negative for chills, fevers, sweats.    Eyes: 	Negative for visual disturbance.  Ears, nose, mouth, throat, and face: Negative for epistaxis, nasal congestion, sore throat and tinnitus.  Neck:	Negative for enlargement, pain and difficulty in swallowing  Respiration : Negative for cough, dyspnea on exertion, pleuritic chest pain and wheezing  Cardiovascular: Negative for chest pain, dyspnea and palpitations    Gastrointestinal : Negative for abdominal pain, diarrhea, nausea and vomiting  Genitourinary: Negative for dysuria, frequency and urinary incontinence .  Skin: Negative for  rash, pruritus, swelling, dryness .  	  Hematologic/lymphatic: Negative for bleeding and easy bruising  Musculoskeletal: Negative for arthralgias, back pain and muscle weakness.  Neurological: Negative for dizziness, headaches, seizures and tremors  Behavioral/Psych: Negative for mood change, depression.  Endocrine:	Negative for blurry vision, polydipsia and polyuria, diaphoresis.   Allergic/Immunologic:	Negative for anaphylaxis, angioedema and urticaria.      Vital Signs Last 24 Hrs  T(C): 36.7 (29 Jul 2017 21:00), Max: 36.7 (29 Jul 2017 21:00)  T(F): 98.1 (29 Jul 2017 21:00), Max: 98.1 (29 Jul 2017 21:00)  HR: 46 (30 Jul 2017 06:00) (46 - 68)  BP: 100/62 (30 Jul 2017 06:00) (84/36 - 117/73)  BP(mean): 70 (30 Jul 2017 06:00) (47 - 82)  RR: --  SpO2: 100% (30 Jul 2017 06:00) (97% - 100%)          PHYSICAL EXAM  General Appearance: cooperative, no acute distress,   HEENT: PERRL, conjunctiva clear, EOM's intact, non injected pharynx, no exudate.  Neck: Supple, , no adenopathy, thyroid: not enlarged, no carotid bruit or JVD  Lungs: Clear to auscultation bilateral,no adventitious breath sounds, normal   expiratory phase  Heart: Regular rate and rhythm, S1, S2 normal, no murmur, rub or gallop  Abdomen: Soft, non-tender, bowel sounds active , no hepatosplenomegaly  Extremities: no cyanosis or edema, no joint swelling , right groin normal.  Skin: Skin color, texture normal, no rashes   Neurologic: Alert and oriented X3 , cranial nerves intact, sensory and motor normal,        INTERPRETATION OF TELEMETRY: NSR     ECG: NSR NSST changes        LABS:                          15.0   13.3  )-----------( 234      ( 30 Jul 2017 04:54 )             43.0     07-30    139  |  105  |  18  ----------------------------<  103<H>  4.1   |  29  |  1.26    Ca    8.8      30 Jul 2017 04:54          Lipid Panel  247  32  164  255
Cardiology NP    Patient is a 46y old  Male who presents with a chief complaint of chest pain (27 Jul 2017 19:56)      HPI:  47 y/o male with PMHx of HLD that presents to the ED after acute onset of central chest pain while playing basketball today.   Patient states that he usually gets "fatigued' while playing at times.  He states that today while playing he developed 10/10 central chest pain, radiating to the teeth and left shoulder.  He states that the pain lasted for about 25 minutes and worsened when walking.  He denies nausea or vomiting.  Patient seen in ED with STEMI in anterior leads.  He was taken to cath lab and had thrombectomy and stent to LAD. (27 Jul 2017 19:56)      PAST MEDICAL & SURGICAL HISTORY:  Hyperlipidemia LDL goal <130  No pertinent past medical history  H/O arthroscopic knee surgery      MEDICATIONS  (STANDING):  aspirin  chewable 81 milliGRAM(s) Chew daily  metoprolol 25 milliGRAM(s) Oral two times a day  lisinopril 2.5 milliGRAM(s) Oral daily  atorvastatin 80 milliGRAM(s) Oral at bedtime  ticagrelor 90 milliGRAM(s) Oral two times a day  sodium chloride 0.9%. 1000 milliLiter(s) (50 mL/Hr) IV Continuous <Continuous>  pantoprazole    Tablet 40 milliGRAM(s) Oral before breakfast    MEDICATIONS  (PRN):  aluminum hydroxide/magnesium hydroxide/simethicone Suspension 30 milliLiter(s) Oral every 6 hours PRN Dyspepsia  acetaminophen   Tablet. 650 milliGRAM(s) Oral every 6 hours PRN Moderate Pain (4 - 6)      Allergies    No Known Drug Allergies  Nuts (Unknown)    Intolerances        REVIEW OF SYSTEMS:  Negative unless otherwise stated    Vital Signs Last 24 Hrs  T(C): 36.6 (29 Jul 2017 08:29), Max: 36.8 (28 Jul 2017 20:00)  T(F): 97.8 (29 Jul 2017 08:29), Max: 98.2 (28 Jul 2017 20:00)  HR: 61 (29 Jul 2017 12:00) (49 - 63)  BP: 111/57 (29 Jul 2017 12:00) (83/53 - 111/57)  BP(mean): 69 (29 Jul 2017 12:00) (47 - 79)  RR: --  SpO2: 99% (29 Jul 2017 12:00) (95% - 100%)    PHYSICAL EXAM: looks and feels well, mood is upbeat, but does express anxiety about going home.  Neuro: A&Ox3  Lungs: CTA bilaterally  COR: S1S2 present; telemetry monitor shows NSR to sinus alex  Extremities: GOMEZ, right femoral artery access site with no evidence of hematoma. Access site with no bleeding/drainage, no swelling and no ecchymosis.  Pain: Patient denies pain and/or discomfort.    LABS:                        15.1   11.3  )-----------( 239      ( 28 Jul 2017 06:04 )             42.8     07-28    137  |  104  |  13  ----------------------------<  103<H>  3.7   |  28  |  1.11    Ca    8.6      28 Jul 2017 06:04    TPro  7.4  /  Alb  4.4  /  TBili  0.7  /  DBili  x   /  AST  47<H>  /  ALT  78  /  AlkPhos  103  07-27        CAPILLARY BLOOD GLUCOSE          RADIOLOGY & ADDITIONAL TESTS:    Assessment/Plan: 47 y/o male S/P STEMI and PTCI to the pLAD on 7/27/17. Patient had been playing basketball when he developed chest pain which radiated to his teeth and left arm. Emergently brought to CCL for intervention.  Overnight patient experienced nausea and chest pain. B/PS 90s-100s and HR 40s - 50s.   Discussed with Dr. Rothman, plan is to reduce or d/c lisinopril, toprol xl daily and likely change brillinta to plavix.    PMHx significant for: HLD.    D/C home tomorrow.  -ambulate today  -social work consult for health insurance issues per patient request  -dietary consult per patient request    Medications:  -atorvastatin  -ASA 81mg  -brillinta 90mg q12h  -metoprolol tartrate 25mg q12h  -lisinopril 2.5mg     Discussed procedure results, medications, activities and follow-up with RN, Dr. Rothman, Dr. Enedelia Schaefer patient and SO.      Follow-up: Dr. Rothman, patient to be seen in office early next week.

## 2017-07-30 NOTE — DISCHARGE NOTE ADULT - CARE PLAN
Principal Discharge DX:	ST elevation myocardial infarction (STEMI), unspecified artery  Goal:	prevent recurrence  Instructions for follow-up, activity and diet:	take meds as advised and follow up with Dr. Rothman this coming week.   Have repeat echo done to follow up heart function.

## 2017-07-30 NOTE — DISCHARGE NOTE ADULT - HOSPITAL COURSE
45 y/o male with PMHx of HLD off statin presented to the ER after acute onset of central chest pain while playing basketball.   Patient states that he had been very fatigued lately. He attributed it to stress and heat. He exercises regularly, however while playing basketball, he  developed 10/10 central chest pain, radiating to the teeth and left shoulder.  He states that the pain lasted for about 25 minutes and worsened when walking.  In ED, EKG revealed STEMI in anterior leads.  He was taken to cath lab and had thrombectomy and stent to LAD. He was treated with asa/brillinta/statin/bb and ace-i. He had a 2Decho which showed LVEF 35-40%. He is medically stable for dc home today. HE is advised close outpt f/u with Dr. Rothman.    Vital Signs Last 24 Hrs  T(C): 36.7 (29 Jul 2017 21:00), Max: 36.7 (29 Jul 2017 21:00)  T(F): 98.1 (29 Jul 2017 21:00), Max: 98.1 (29 Jul 2017 21:00)  HR: 53 (30 Jul 2017 08:00) (46 - 68)  BP: 99/55 (30 Jul 2017 08:00) (95/57 - 117/73)  BP(mean): 65 (30 Jul 2017 08:00) (52 - 82)  SpO2: 100% (30 Jul 2017 08:00) (97% - 100%)    PHYSICAL EXAM:    GENERAL: Comfortable, no acute distress   HEAD:  Normocephalic, atraumatic  EYES: EOMI, PERRLA  HEENT: Moist mucous membranes  NECK: Supple, No JVD  NERVOUS SYSTEM:  Alert & Oriented X3, Motor Strength 5/5 B/L upper and lower extremities  CHEST/LUNG: Clear to auscultation bilaterally  HEART: Regular rate and rhythm  ABDOMEN: Soft, Nontender, Nondistended, Bowel sounds present  GENITOURINARY: Voiding, no palpable bladder  EXTREMITIES:   No clubbing, cyanosis, or edema  MUSCULOSKELTAL- No muscle tenderness, no joint tenderness  SKIN-no rash    LABS:                        15.0   13.3  )-----------( 234      ( 30 Jul 2017 04:54 )             43.0     07-30    139  |  105  |  18  ----------------------------<  103<H>  4.1   |  29  |  1.26    Ca    8.8      30 Jul 2017 04:54    Transthoracic Echocardiogram (07.28.17 @ 10:24) >     Summary     Left ventricle systolic function appears moderately impaired; Anterior   and   anteroseptal wall hypokinesis. Estimated Ejection Fraction is 35-40%.    Final diagnosis:    1. Anterior  STEMI  2. Acute ischemic cardiomyopathy, ECHO with  LVEF 35-40%  3. Possible underlying sleep apnea:  -advised outpt sleep studies.  4. HERI improved    Time taken in preparation for dc 65 min  plan d/w patient and cardiology  left message for pcp-Dr. Lagunas

## 2017-07-30 NOTE — DISCHARGE NOTE ADULT - ADDITIONAL INSTRUCTIONS
1. Follow up with DR. Rothman as advised  2. Follow up with your pcp for sleep study referral to evaluate for sleep apnea.

## 2017-08-02 DIAGNOSIS — N17.9 ACUTE KIDNEY FAILURE, UNSPECIFIED: ICD-10-CM

## 2017-08-02 DIAGNOSIS — E78.5 HYPERLIPIDEMIA, UNSPECIFIED: ICD-10-CM

## 2017-08-02 DIAGNOSIS — I21.09 ST ELEVATION (STEMI) MYOCARDIAL INFARCTION INVOLVING OTHER CORONARY ARTERY OF ANTERIOR WALL: ICD-10-CM

## 2017-08-02 DIAGNOSIS — I25.10 ATHEROSCLEROTIC HEART DISEASE OF NATIVE CORONARY ARTERY WITHOUT ANGINA PECTORIS: ICD-10-CM

## 2017-08-02 DIAGNOSIS — G47.30 SLEEP APNEA, UNSPECIFIED: ICD-10-CM

## 2017-08-02 DIAGNOSIS — I25.5 ISCHEMIC CARDIOMYOPATHY: ICD-10-CM

## 2018-09-25 NOTE — ED ADULT NURSE NOTE - NEURO BEHAVIOR
----- Message from Tabitha Cross MD sent at 9/25/2018  1:16 PM CDT -----  All of the lab is stable, we will discuss your lab in detail at the upcoming appointment. This copy is provided for your records.     calm

## 2020-06-26 NOTE — H&P ADULT - MINUTES
Cardio 3x a week 30 min    [employer] 8000     [employer] or weight watchers   Intermittent fasting      50

## 2021-04-22 NOTE — CHART NOTE - NSCHARTNOTESELECT_GEN_ALL_CORE
Event Note MD Notification    Notified Person: MD    Notified Person Name: Dr. Bernabe    Notification Date/Time: April 22, 2021, 8:12 AM    Notification Interaction:    Purpose of Notification: FYI sepsis protocol fired this morning for room 305. WBC:15.7 (was 11.6).      Orders Received:    Comments:

## 2021-11-11 NOTE — H&P ADULT - ASSESSMENT
No
47 y/o male with PMHx of HLD that presents to the ED after acute onset of central chest pain while playing basketball today.  On presentation to ED found to STEMI    Admit to CCU  Vitals per routine

## 2022-11-20 NOTE — ED PROVIDER NOTE - CROS ED PSYCH ALL NEG
Hypertensive emergency Chronic kidney disease, unspecified CKD stage Unstable angina Chronic kidney disease, unspecified CKD stage Hypertensive emergency Hypertensive emergency Acute delirium Hypertensive emergency Acute delirium Acute delirium negative...

## 2023-08-22 PROBLEM — E78.5 HYPERLIPIDEMIA, UNSPECIFIED: Chronic | Status: ACTIVE | Noted: 2017-07-27

## 2024-12-10 NOTE — ED ADULT TRIAGE NOTE - WEIGHT IN LBS
[place] [facility]  Neurology Consult    Date:  12/10/2024  Patient Name:  Johny Nichols  YOB: 1935  MRN: [id number]     PCP:  Nicholas Beal MD   Referring:  No ref. provider found      Chief Complaint: stroke like symptoms     History obtained from: chart, patient     Assessment  Johny Nichols is a 89 y.o. male with history of dementia, prior stroke with residual L sided weakness presenting with worsening L side weakness and dysarthria. Evidence of complex atherosclerotic plaque in the FREEDOM concerning for potentially symptomatic FREEDOM stenosis. MRI brain, CUS pending to further evaluate. Was loaded with ASA in the ED and placed on ASA 81 mg daily. If MRI negative for new stroke, possible stroke recrudescence    Hx of Afib. INR 3.3 on arrival. Previously on Coumadin, was taken off by PCP several weeks ago due to age and was placed on Eliquis at that time. Unknown if patient had discontinued this medication at home or not per ED note.     Plan  MRI brain-- will need to assess for new area of stroke and stroke burden prior to resuming AC for his AFib  CUS  Echocardiogram  Check LDL, A1C  Increase Lipitor from 20 mg to 40 mg  Continue ASA  PT/OT/speech  Discussed with HALLE who plans to see the patient today as he was notified in the ER about this patient yesterday.  However vascular surgery also consulted? -- will have to clarify who is managing the FREEDOM stenosis   Will follow     History of Present Illness:  Johny Nichols is a 89 y.o.  male presenting for evaluation of stroke symptoms.    PMH significant for dementia, HTN, HLD, DM, prior stroke, Afib, CKD    Presented with increased L side weakness and dysarthria-- though some notes report ? Right facial droop. He does have residual L side weakness from prior stroke. HALLE was contacted by ED- noted to have complex atherosclerotic plaque of the FREEDOM of at least 80% or more with concern for symptomatic. Was loaded with ASA.    Hx of Afib-  89